# Patient Record
Sex: FEMALE | Race: WHITE | NOT HISPANIC OR LATINO | URBAN - METROPOLITAN AREA
[De-identification: names, ages, dates, MRNs, and addresses within clinical notes are randomized per-mention and may not be internally consistent; named-entity substitution may affect disease eponyms.]

---

## 2023-02-15 ENCOUNTER — OFFICE VISIT (OUTPATIENT)
Dept: URGENT CARE | Facility: CLINIC | Age: 33
End: 2023-02-15

## 2023-02-15 VITALS
WEIGHT: 164 LBS | HEIGHT: 68 IN | DIASTOLIC BLOOD PRESSURE: 60 MMHG | TEMPERATURE: 97.6 F | RESPIRATION RATE: 20 BRPM | HEART RATE: 94 BPM | OXYGEN SATURATION: 100 % | SYSTOLIC BLOOD PRESSURE: 102 MMHG | BODY MASS INDEX: 24.86 KG/M2

## 2023-02-15 DIAGNOSIS — U07.1 COVID-19 VIRUS INFECTION: Primary | ICD-10-CM

## 2023-02-15 LAB
SARS-COV-2 AG UPPER RESP QL IA: POSITIVE
VALID CONTROL: ABNORMAL

## 2023-02-15 NOTE — PROGRESS NOTES
[facility]        NAME: Diane Reyes is a 27 y o  female  : 1990    MRN: [id number]  DATE: February 15, 2023  TIME: 2:08 PM    Assessment and Plan   COVID-19 virus infection [U07 1]  1  COVID-19 virus infection  Poct Covid 19 Rapid Antigen Test            Patient Instructions     Patient Instructions   Patient tested positive for COVID-19 in office today  Results may be accessed via [facility]  Patient was informed that she must remain at home on self isolation for 5 days from date of symptom onset, and continue to wear a mask around others for an additional 5 days after that  Patient was informed that she must be fever free for at least 24 hours without medications prior to discontinuing isolation  Patient has been instructed to rest at home, drink plenty of fluids, take Tylenol or Motrin as needed, drink warm tea w/ lemon and honey, run a humidifier at home, and take a multivitamin daily  If symptoms persist despite treatment, worsen, or any new symptoms present, patient is to be seen in the ER  Follow up with PCP in 3-5 days  Proceed to  ER if symptoms worsen  Chief Complaint     Chief Complaint   Patient presents with   • Cold Like Symptoms     Pt here ill x 3 days pt states chills, runny nose, cough, sore throat  Pt used Mucinex and Tylenol  Home Covid was negative on day 1  Pt is vaxed, no flu shot  History of Present Illness       26 yo female presents c/o nasal congestion, rhinorrhea, sore throat, and cough  She had a fever of 100 7 yesterday, no fever since  She has felt chills, headache, and body aches  No chest pain, SOB, or wheezing  Non-smoker  No GI sx  No skin rashes  No loss of taste or smell  No recent travel or known exposure to anyone with COVID-19  Patient has received the COVID-19 vaccine  She states she tested for COVID-19 at home on the first day of illness and was negative  She has taken Mucinex and Tylenol for the symptoms        Review of Systems   Review of Systems   Constitutional:        As noted in HPI   HENT:        As noted in HPI   Eyes: Negative  Respiratory:        As noted in HPI   Cardiovascular: Negative  Gastrointestinal: Negative  Musculoskeletal:        As noted in HPI   Skin: Negative  Allergic/Immunologic:        As noted in chart   Neurological: Negative  Hematological: Negative  Current Medications       Current Outpatient Medications:   •  Prenatal MV-Min-Fe Fum-FA-DHA (PRENATAL 1 PO), Take by mouth, Disp: , Rfl:   •  VITAMIN D PO, Take by mouth, Disp: , Rfl:     Current Allergies     Allergies as of 02/15/2023 - Reviewed 02/15/2023   Allergen Reaction Noted   • Prednisone Swelling 08/06/2022            The following portions of the patient's history were reviewed and updated as appropriate: allergies, current medications, past family history, past medical history, past social history, past surgical history and problem list      Past Medical History:   Diagnosis Date   • Patient denies medical problems        Past Surgical History:   Procedure Laterality Date   • LASIK         Family History   Problem Relation Age of Onset   • Cancer Father          Medications have been verified  Objective   /60   Pulse 94   Temp 97 6 °F (36 4 °C)   Resp 20   Ht 5' 8" (1 727 m)   Wt 74 4 kg (164 lb)   SpO2 100%   BMI 24 94 kg/m²   No LMP recorded  Physical Exam     Physical Exam  Vitals and nursing note reviewed  Constitutional:       General: She is awake  She is not in acute distress  Appearance: Normal appearance  She is well-developed and well-groomed  She is not ill-appearing, toxic-appearing or diaphoretic  HENT:      Head: Normocephalic and atraumatic  Right Ear: Tympanic membrane, ear canal and external ear normal       Left Ear: Tympanic membrane, ear canal and external ear normal       Nose: Mucosal edema, congestion and rhinorrhea present  Rhinorrhea is clear  Mouth/Throat:      Lips: Pink  No lesions  Mouth: Mucous membranes are moist       Pharynx: Uvula midline  Posterior oropharyngeal erythema present  No pharyngeal swelling, oropharyngeal exudate or uvula swelling  Tonsils: No tonsillar exudate or tonsillar abscesses  Eyes:      General: Lids are normal       Conjunctiva/sclera: Conjunctivae normal    Neck:      Trachea: Trachea and phonation normal    Cardiovascular:      Rate and Rhythm: Normal rate and regular rhythm  Pulses: Normal pulses  Heart sounds: Normal heart sounds  Pulmonary:      Effort: Pulmonary effort is normal  No tachypnea, accessory muscle usage or respiratory distress  Breath sounds: Normal breath sounds and air entry  Musculoskeletal:      Cervical back: Neck supple  No edema, erythema, rigidity or tenderness  Lymphadenopathy:      Cervical: No cervical adenopathy  Skin:     General: Skin is warm and dry  Capillary Refill: Capillary refill takes less than 2 seconds  Coloration: Skin is not pale  Neurological:      Mental Status: She is alert and oriented to person, place, and time  Mental status is at baseline  Psychiatric:         Mood and Affect: Mood normal          Behavior: Behavior normal  Behavior is cooperative  Thought Content:  Thought content normal          Judgment: Judgment normal

## 2023-02-15 NOTE — PATIENT INSTRUCTIONS
Patient tested positive for COVID-19 in office today  Results may be accessed via [facility]  Patient was informed that she must remain at home on self isolation for 5 days from date of symptom onset, and continue to wear a mask around others for an additional 5 days after that  Patient was informed that she must be fever free for at least 24 hours without medications prior to discontinuing isolation  Patient has been instructed to rest at home, drink plenty of fluids, take Tylenol or Motrin as needed, drink warm tea w/ lemon and honey, run a humidifier at home, and take a multivitamin daily  If symptoms persist despite treatment, worsen, or any new symptoms present, patient is to be seen in the ER